# Patient Record
Sex: FEMALE | Race: WHITE | Employment: UNEMPLOYED | URBAN - METROPOLITAN AREA
[De-identification: names, ages, dates, MRNs, and addresses within clinical notes are randomized per-mention and may not be internally consistent; named-entity substitution may affect disease eponyms.]

---

## 2021-12-26 ENCOUNTER — HOSPITAL ENCOUNTER (INPATIENT)
Age: 31
LOS: 2 days | Discharge: HOME | End: 2021-12-28
Attending: OBSTETRICS & GYNECOLOGY | Admitting: OBSTETRICS & GYNECOLOGY
Payer: OTHER GOVERNMENT

## 2021-12-26 LAB
ABO + RH BLD: NORMAL
BASOPHILS # BLD: 0 K/UL (ref 0–0.1)
BASOPHILS NFR BLD: 0 % (ref 0–2)
BLOOD GROUP ANTIBODIES SERPL: NORMAL
DIFFERENTIAL METHOD BLD: ABNORMAL
EOSINOPHIL # BLD: 0.1 K/UL (ref 0–0.4)
EOSINOPHIL NFR BLD: 1 % (ref 0–5)
ERYTHROCYTE [DISTWIDTH] IN BLOOD BY AUTOMATED COUNT: 15.1 % (ref 11.6–14.5)
HCT VFR BLD AUTO: 29.4 % (ref 35–45)
HGB BLD-MCNC: 8.9 G/DL (ref 12–16)
IMM GRANULOCYTES # BLD AUTO: 0.1 K/UL (ref 0–0.04)
IMM GRANULOCYTES NFR BLD AUTO: 1 % (ref 0–0.5)
LYMPHOCYTES # BLD: 1.7 K/UL (ref 0.9–3.6)
LYMPHOCYTES NFR BLD: 19 % (ref 21–52)
MCH RBC QN AUTO: 24.9 PG (ref 24–34)
MCHC RBC AUTO-ENTMCNC: 30.3 G/DL (ref 31–37)
MCV RBC AUTO: 82.4 FL (ref 78–100)
MONOCYTES # BLD: 1 K/UL (ref 0.05–1.2)
MONOCYTES NFR BLD: 11 % (ref 3–10)
NEUTS SEG # BLD: 6.3 K/UL (ref 1.8–8)
NEUTS SEG NFR BLD: 69 % (ref 40–73)
NRBC # BLD: 0.02 K/UL (ref 0–0.01)
NRBC BLD-RTO: 0.2 PER 100 WBC
PLATELET # BLD AUTO: 250 K/UL (ref 135–420)
PMV BLD AUTO: 12.1 FL (ref 9.2–11.8)
RBC # BLD AUTO: 3.57 M/UL (ref 4.2–5.3)
SPECIMEN EXP DATE BLD: NORMAL
WBC # BLD AUTO: 9.2 K/UL (ref 4.6–13.2)

## 2021-12-26 PROCEDURE — 86901 BLOOD TYPING SEROLOGIC RH(D): CPT

## 2021-12-26 PROCEDURE — 65270000029 HC RM PRIVATE

## 2021-12-26 PROCEDURE — 74011250636 HC RX REV CODE- 250/636: Performed by: ADVANCED PRACTICE MIDWIFE

## 2021-12-26 PROCEDURE — 85025 COMPLETE CBC W/AUTO DIFF WBC: CPT

## 2021-12-26 PROCEDURE — 74011250636 HC RX REV CODE- 250/636: Performed by: MIDWIFE

## 2021-12-26 PROCEDURE — 75410000002 HC LABOR FEE PER 1 HR

## 2021-12-26 RX ORDER — OXYTOCIN/RINGER'S LACTATE 30/500 ML
10 PLASTIC BAG, INJECTION (ML) INTRAVENOUS AS NEEDED
Status: COMPLETED | OUTPATIENT
Start: 2021-12-26 | End: 2021-12-27

## 2021-12-26 RX ORDER — OXYTOCIN/0.9 % SODIUM CHLORIDE 30/500 ML
0-20 PLASTIC BAG, INJECTION (ML) INTRAVENOUS
Status: DISCONTINUED | OUTPATIENT
Start: 2021-12-26 | End: 2021-12-28 | Stop reason: HOSPADM

## 2021-12-26 RX ORDER — SODIUM CHLORIDE, SODIUM LACTATE, POTASSIUM CHLORIDE, CALCIUM CHLORIDE 600; 310; 30; 20 MG/100ML; MG/100ML; MG/100ML; MG/100ML
125 INJECTION, SOLUTION INTRAVENOUS CONTINUOUS
Status: DISCONTINUED | OUTPATIENT
Start: 2021-12-26 | End: 2021-12-27 | Stop reason: HOSPADM

## 2021-12-26 RX ORDER — OXYTOCIN/0.9 % SODIUM CHLORIDE 30/500 ML
87.3 PLASTIC BAG, INJECTION (ML) INTRAVENOUS AS NEEDED
Status: DISCONTINUED | OUTPATIENT
Start: 2021-12-26 | End: 2021-12-27 | Stop reason: HOSPADM

## 2021-12-26 RX ORDER — LIDOCAINE HYDROCHLORIDE 10 MG/ML
20 INJECTION, SOLUTION EPIDURAL; INFILTRATION; INTRACAUDAL; PERINEURAL AS NEEDED
Status: DISCONTINUED | OUTPATIENT
Start: 2021-12-26 | End: 2021-12-27 | Stop reason: HOSPADM

## 2021-12-26 RX ORDER — TERBUTALINE SULFATE 1 MG/ML
0.25 INJECTION SUBCUTANEOUS
Status: DISCONTINUED | OUTPATIENT
Start: 2021-12-26 | End: 2021-12-27 | Stop reason: HOSPADM

## 2021-12-26 RX ORDER — NALBUPHINE HYDROCHLORIDE 10 MG/ML
10 INJECTION, SOLUTION INTRAMUSCULAR; INTRAVENOUS; SUBCUTANEOUS
Status: DISCONTINUED | OUTPATIENT
Start: 2021-12-26 | End: 2021-12-27 | Stop reason: HOSPADM

## 2021-12-26 RX ORDER — METHYLERGONOVINE MALEATE 0.2 MG/ML
0.2 INJECTION INTRAVENOUS AS NEEDED
Status: DISCONTINUED | OUTPATIENT
Start: 2021-12-26 | End: 2021-12-27 | Stop reason: HOSPADM

## 2021-12-26 RX ORDER — BUTORPHANOL TARTRATE 2 MG/ML
2 INJECTION INTRAMUSCULAR; INTRAVENOUS
Status: DISCONTINUED | OUTPATIENT
Start: 2021-12-26 | End: 2021-12-27 | Stop reason: HOSPADM

## 2021-12-26 RX ORDER — HYDROMORPHONE HYDROCHLORIDE 1 MG/ML
1 INJECTION, SOLUTION INTRAMUSCULAR; INTRAVENOUS; SUBCUTANEOUS
Status: DISCONTINUED | OUTPATIENT
Start: 2021-12-26 | End: 2021-12-27 | Stop reason: HOSPADM

## 2021-12-26 RX ORDER — LORATADINE 10 MG/1
10 TABLET ORAL
COMMUNITY

## 2021-12-26 RX ORDER — MINERAL OIL
30 OIL (ML) ORAL AS NEEDED
Status: DISCONTINUED | OUTPATIENT
Start: 2021-12-26 | End: 2021-12-27 | Stop reason: HOSPADM

## 2021-12-26 RX ADMIN — Medication 6 MILLI-UNITS/MIN: at 22:01

## 2021-12-26 RX ADMIN — Medication 4 MILLI-UNITS/MIN: at 20:34

## 2021-12-26 RX ADMIN — SODIUM CHLORIDE, POTASSIUM CHLORIDE, SODIUM LACTATE AND CALCIUM CHLORIDE 125 ML/HR: 600; 310; 30; 20 INJECTION, SOLUTION INTRAVENOUS at 18:11

## 2021-12-26 RX ADMIN — Medication 2 MILLI-UNITS/MIN: at 19:44

## 2021-12-26 NOTE — H&P
History & Physical    Name: Ronald Adame MRN: [id number]  SSN: [id number]    YOB: 1990  Age: 27 y.o. Sex: female        Subjective:     Estimated Date of Delivery: 21  OB History        2    Para   1    Term   1            AB        Living           SAB        IAB        Ectopic        Molar        Multiple        Live Births                      Ms. Manolo Joyner is admitted with pregnancy at 41w0d for induction of labor. Prenatal course was complicated by anemia, EIF noted on u/s (quad screen negative), hx clavicle fracture in G1. [place] Please see prenatal records for details. No Known Allergies    Objective:     Vitals:  Vitals:    21 1728 21 1731   BP: 134/86    Pulse: (!) 119    Resp: 20    Temp: 97.7 °F (36.5 °C)    Weight:  105.2 kg (232 lb)   Height:  5' 7\" (1.702 m)        Physical Exam:  Patient without distress. Heart: Regular rate and rhythm, S1S2 present or without murmur or extra heart sounds  Lung: clear to auscultation throughout lung fields, no wheezes, no rales, no rhonchi and normal respiratory effort  Abdomen: soft, nontender  Fundus: soft and non tender  Perineum: blood absent, amniotic fluid absent  Cervical Exam: 4 cm dilated    50% effaced    -2 station    Presenting Part: cephalic  Cervical Position: posterior  Consistency: Soft  Membranes:  Intact  Fetal Heart Rate & Contraction pattern: Baseline: 165 per minute  Variability: moderate  Accelerations: yes  Decelerations: none  Uterine contractions: irregular, mild to palpation    Prenatal Labs:   No results found for: RUBELLAEXT, GRBSEXT, HBSAGEXT, HIVEXT, RPREXT, GONNOEXT, CHLAMEXT      Assessment/Plan:     Plan: Admit for IOL. Group B Strep was negative. -PIV, LR bolus now for fetal tachysystole  -Continuous EFM  -Anesthesia consult at pt request, pt has ncb plan. -Pitocin titration per protocol once CAT I FHT established  -Anticipate vaginal delivery. C/D for maternal/fetal indications. Dr. Tiffany Carrera notified of patient arrival and POC. Agrees at this time.     Signed By:  Santo Infante CNM     December 26, 2021

## 2021-12-27 PROBLEM — Z33.1 IUP (INTRAUTERINE PREGNANCY), INCIDENTAL: Status: ACTIVE | Noted: 2021-12-27

## 2021-12-27 PROBLEM — O48.0 POST TERM PREGNANCY AT 41 WEEKS GESTATION: Status: ACTIVE | Noted: 2021-12-27

## 2021-12-27 PROBLEM — O48.0 41 WEEKS GESTATION OF PREGNANCY: Status: ACTIVE | Noted: 2021-12-27

## 2021-12-27 PROBLEM — Z3A.41 POST TERM PREGNANCY AT 41 WEEKS GESTATION: Status: ACTIVE | Noted: 2021-12-27

## 2021-12-27 PROBLEM — Z3A.41 41 WEEKS GESTATION OF PREGNANCY: Status: ACTIVE | Noted: 2021-12-27

## 2021-12-27 PROCEDURE — 74011250637 HC RX REV CODE- 250/637

## 2021-12-27 PROCEDURE — 3E033VJ INTRODUCTION OF OTHER HORMONE INTO PERIPHERAL VEIN, PERCUTANEOUS APPROACH: ICD-10-PCS | Performed by: MIDWIFE

## 2021-12-27 PROCEDURE — 10907ZC DRAINAGE OF AMNIOTIC FLUID, THERAPEUTIC FROM PRODUCTS OF CONCEPTION, VIA NATURAL OR ARTIFICIAL OPENING: ICD-10-PCS | Performed by: MIDWIFE

## 2021-12-27 PROCEDURE — 74011250636 HC RX REV CODE- 250/636: Performed by: MIDWIFE

## 2021-12-27 PROCEDURE — 0HQ9XZZ REPAIR PERINEUM SKIN, EXTERNAL APPROACH: ICD-10-PCS | Performed by: MIDWIFE

## 2021-12-27 PROCEDURE — 74011250637 HC RX REV CODE- 250/637: Performed by: MIDWIFE

## 2021-12-27 PROCEDURE — 75410000002 HC LABOR FEE PER 1 HR

## 2021-12-27 PROCEDURE — 75410000000 HC DELIVERY VAGINAL/SINGLE

## 2021-12-27 PROCEDURE — 75410000003 HC RECOV DEL/VAG/CSECN EA 0.5 HR

## 2021-12-27 PROCEDURE — 74011000250 HC RX REV CODE- 250

## 2021-12-27 PROCEDURE — 74011250636 HC RX REV CODE- 250/636: Performed by: ADVANCED PRACTICE MIDWIFE

## 2021-12-27 PROCEDURE — 65270000029 HC RM PRIVATE

## 2021-12-27 RX ORDER — AMOXICILLIN 250 MG
1 CAPSULE ORAL
Status: DISCONTINUED | OUTPATIENT
Start: 2021-12-27 | End: 2021-12-28 | Stop reason: HOSPADM

## 2021-12-27 RX ORDER — FENTANYL CITRATE 50 UG/ML
100 INJECTION, SOLUTION INTRAMUSCULAR; INTRAVENOUS ONCE
Status: COMPLETED | OUTPATIENT
Start: 2021-12-27 | End: 2021-12-27

## 2021-12-27 RX ORDER — IBUPROFEN 400 MG/1
800 TABLET ORAL
Status: DISCONTINUED | OUTPATIENT
Start: 2021-12-27 | End: 2021-12-28 | Stop reason: HOSPADM

## 2021-12-27 RX ORDER — OXYCODONE AND ACETAMINOPHEN 5; 325 MG/1; MG/1
2 TABLET ORAL
Status: DISCONTINUED | OUTPATIENT
Start: 2021-12-27 | End: 2021-12-28 | Stop reason: HOSPADM

## 2021-12-27 RX ORDER — ONDANSETRON 2 MG/ML
4 INJECTION INTRAMUSCULAR; INTRAVENOUS
Status: DISCONTINUED | OUTPATIENT
Start: 2021-12-27 | End: 2021-12-28 | Stop reason: HOSPADM

## 2021-12-27 RX ORDER — LANOLIN ALCOHOL/MO/W.PET/CERES
3 CREAM (GRAM) TOPICAL
Status: DISCONTINUED | OUTPATIENT
Start: 2021-12-27 | End: 2021-12-28 | Stop reason: HOSPADM

## 2021-12-27 RX ORDER — MISOPROSTOL 200 UG/1
TABLET ORAL
Status: COMPLETED
Start: 2021-12-27 | End: 2021-12-27

## 2021-12-27 RX ORDER — PROMETHAZINE HYDROCHLORIDE 25 MG/ML
25 INJECTION, SOLUTION INTRAMUSCULAR; INTRAVENOUS
Status: DISCONTINUED | OUTPATIENT
Start: 2021-12-27 | End: 2021-12-28 | Stop reason: HOSPADM

## 2021-12-27 RX ORDER — ONDANSETRON 2 MG/ML
INJECTION INTRAMUSCULAR; INTRAVENOUS
Status: DISCONTINUED
Start: 2021-12-27 | End: 2021-12-27 | Stop reason: WASHOUT

## 2021-12-27 RX ORDER — FENTANYL CITRATE 50 UG/ML
INJECTION, SOLUTION INTRAMUSCULAR; INTRAVENOUS
Status: DISPENSED
Start: 2021-12-27 | End: 2021-12-27

## 2021-12-27 RX ORDER — ACETAMINOPHEN 325 MG/1
650 TABLET ORAL
Status: DISCONTINUED | OUTPATIENT
Start: 2021-12-27 | End: 2021-12-28 | Stop reason: HOSPADM

## 2021-12-27 RX ORDER — TRANEXAMIC ACID 10 MG/ML
INJECTION, SOLUTION INTRAVENOUS
Status: COMPLETED
Start: 2021-12-27 | End: 2021-12-27

## 2021-12-27 RX ORDER — CALCIUM CARBONATE 200(500)MG
400 TABLET,CHEWABLE ORAL AS NEEDED
Status: DISCONTINUED | OUTPATIENT
Start: 2021-12-27 | End: 2021-12-28 | Stop reason: HOSPADM

## 2021-12-27 RX ADMIN — TRANEXAMIC ACID 1000 MG: 10 INJECTION, SOLUTION INTRAVENOUS at 06:52

## 2021-12-27 RX ADMIN — IBUPROFEN 800 MG: 400 TABLET, FILM COATED ORAL at 18:40

## 2021-12-27 RX ADMIN — Medication 10000 MILLI-UNITS: at 06:00

## 2021-12-27 RX ADMIN — ANTACID TABLETS 400 MG: 500 TABLET, CHEWABLE ORAL at 05:03

## 2021-12-27 RX ADMIN — MISOPROSTOL 1000 MCG: 200 TABLET ORAL at 06:52

## 2021-12-27 RX ADMIN — FENTANYL CITRATE 100 MCG: 50 INJECTION, SOLUTION INTRAMUSCULAR; INTRAVENOUS at 06:05

## 2021-12-27 RX ADMIN — SODIUM CHLORIDE, POTASSIUM CHLORIDE, SODIUM LACTATE AND CALCIUM CHLORIDE 125 ML/HR: 600; 310; 30; 20 INJECTION, SOLUTION INTRAVENOUS at 04:06

## 2021-12-27 NOTE — PROGRESS NOTES
1130 TRANSFER - IN REPORT:    Verbal report received from LINDSAY Cee rn(name) on Jennifer Miranda Moder  being received from L&D(unit) for routine progression of care      Report consisted of patients Situation, Background, Assessment and   Recommendations(SBAR). Information from the following report(s) SBAR, Kardex, Intake/Output, MAR and Recent Results was reviewed with the receiving nurse. Opportunity for questions and clarification was provided. Assessment completed upon patients arrival to unit and care assumed. Oriented to room, call bell in reach, notified to  for assist to br , excessive bleeding or clot passage , OOB  tp perinieum, family @ bedside, pericare instructions given  1800 BEDSIDE_VERBAL_RECORDED_WRITTEN: shift change report given to Randa Maldonado (oncoming nurse) by David Estrada (offgoing nurse). Report given with SBAR, Kardex and MAR.

## 2021-12-27 NOTE — PROGRESS NOTES
0730: Bedside and Verbal shift change report given to RAY Palomares and TOYA Brambila RN (oncoming nurse) by Lizabeth Samayoa RN (offgoing nurse). Report included the following information SBAR, Procedure Summary, Intake/Output, MAR and Recent Results. 0930: Pt up to restroom, kamla care, pads and linen changed. 3765: Pt sitting up in chair, breastfeeding.

## 2021-12-27 NOTE — PROGRESS NOTES
1100: TRANSFER - IN REPORT:    Verbal report received from RAY Lopez RN (name) on Delories Mas  being received from Labor and delivery (unit) for routine progression of care      Report consisted of patients Situation, Background, Assessment and   Recommendations(SBAR). Information from the following report(s) SBAR, Procedure Summary, Intake/Output, MAR and Recent Results was reviewed with the receiving nurse. Opportunity for questions and clarification was provided. 1115: Bedside and verbal shift change report given by Leonle Alcantar RN to Shubham Gay RN.  Relinquished care of pt at this time

## 2021-12-27 NOTE — PROGRESS NOTES
191 Bedside and Verbal shift change report given to KENZIE Powers (oncoming nurse) by Carl Dupree RN (offgoing nurse). Report included the following information SBAR, Kardex, Intake/Output, MAR and Recent Results. Pt resting quietly. Birth plan reviewed and opportunity for questions provided. Patient states she plans to labor as long as possible without an epidural and may or may not get one toward the end of labor. Patient and  oriented to room and unit. Denies further needs at this time. [address] S. Ayah Quiros on unit. Reviewed EFM tracing and POC. Will start pitocin and hold at 6 mu/min for cervical ripening through the night.    0355 S. Corrina DAMONM to bedside per pt request for SVE.    0332 Pt c/o increased pressure. Zhane DAMONM to bedside. [address]. Corrina DAMONM to bedside. 0518 Pt states pushing involuntarily with contractions. Zhane Dutta CNM called to bedside. 0522 Patient actively pushing during contractions. This RN and provider remain at bedside providing continuous labor support, continuously assessing patient, vital signs, fetal heart rate, contraction pattern, progress of labor and descent, adjusting EFM and TOCO and palpating abdomen and contractions as needed. 1133  viable male infant. See delivery record. 0630 Maria Eugenia care and teaching provided. Ice maria eugenia pad, pad, bed pad changed. Patient instructed to call for assistance prior to getting up out of bed. Call bell within reach. Patient provided with ice water and juice. 3250 Increased bleeding with clots noted. Zhane Dutta CNM on unit doing another delivery. SBAR report given and verbal orders received for TXA and Cytotec.    0715 Bedside and Verbal shift change report given to RAY Crow RNC and TOYA Rhodes RN (oncoming nurse) by Clive Cortes. Sue Powers (offgoing nurse). Report included the following information SBAR, Kardex, Procedure Summary, Intake/Output, MAR and Recent Results.

## 2021-12-27 NOTE — PROGRESS NOTES
1800 Bedside shift report given to TYRESE Pierce, RN (Oncoming Nurse) from Flor Ward RN (outgoing nurse). Report consisted of patients Situation, Background, Assessment and Recommendations(SBAR). Information from the following report(s) SBAR, Kardex, Intake/Output, MAR and Recent Results. 1838 Pt rated pain at 5/10. Educated Pt on pain management, signs and symptoms to report. Pain medication administered. 2036 Pt sleeping with respiratory rate greater than 10. Room light dimmed. 2100 Pt refused Famotidine. 2319 Pt joined at bedside by baby and significant other. AAOx4. VSS. Pain 0/10. Educated Pt on pain management, signs and symptoms to report. Fundus Firm at  U and midline. Scant, rubra lochia. No clots noted. Bed in lowest position. Call bell within reach. 0038 Pt sitting in bed. Pt breastfeeding baby at this time. No needs at this time. 0321 Pt sleeping with respiratory rate greater than 10. Room light dimmed. 0444 Pt sitting in chair. Pt breastfeeding baby at this time. No needs at this time. 7743 Pt sleeping with respiratory rate greater than 10.    0715 Bedside shift report given to KENNY Rice, [address] Olivia Lopez (Oncoming Nurse) from Cassidy Peng RN (outgoing nurse). Report consisted of patients Situation, Background, Assessment and Recommendations(SBAR). Information from the following report(s) SBAR, Kardex, Intake/Output, MAR and Recent Results.

## 2021-12-27 NOTE — PROGRESS NOTES
1100- TRANSFER - OUT REPORT:    Verbal report given to YOSELYN GABRIEL (name) on Radha Perez  being transferred to Mother Baby (unit) for routine progression of care       Report consisted of patients Situation, Background, Assessment and   Recommendations(SBAR). Information from the following report(s) SBAR, Kardex and MAR was reviewed with the receiving nurse. Lines:   Peripheral IV 12/26/21 Anterior; Left Forearm (Active)   Site Assessment Clean, dry, & intact 12/27/21 0730   Phlebitis Assessment 0 12/27/21 0730   Infiltration Assessment 0 12/27/21 0730   Dressing Status Clean, dry, & intact 12/27/21 0730   Dressing Type Transparent 12/27/21 0730   Hub Color/Line Status Pink; Infusing 12/27/21 0730        Opportunity for questions and clarification was provided.       Patient transported with:   Registered Nurse

## 2021-12-27 NOTE — L&D DELIVERY NOTE
Delivery Summary    Patient: Brenda Rhoades MRN: [id number]  SSN: [id number]    YOB: 1990  Age: 27 y.o. Sex: female       Information for the patient's :  Sam gNo [[id number]]       Labor Events:    Labor: No    Steroids:     Cervical Ripening Date/Time:       Cervical Ripening Type: None   Antibiotics During Labor:     Rupture Identifier:      Rupture Date/Time: 2021 4:46 AM   Rupture Type: AROM;Bulging   Amniotic Fluid Volume: Moderate    Amniotic Fluid Description: Meconium    Amniotic Fluid Odor: None    Induction: AROM; Oxytocin       Induction Date/Time:        Indications for Induction: Post-term Gestation    Augmentation:     Augmentation Date/Time:      Indications for Augmentation:     Labor complications: Additional complications:        Delivery Events:  Indications For Episiotomy:     Episiotomy: None   Perineal Laceration(s): 1st   Repaired:     Periurethral Laceration Location:      Repaired:     Labial Laceration Location:     Repaired:     Sulcal Laceration Location:     Repaired:     Vaginal Laceration Location:     Repaired:     Cervical Laceration Location:     Repaired:     Repair Suture: None   Number of Repair Packets:     Estimated Blood Loss (ml): 250ml   Quantitative Blood Loss (ml)                Delivery Date: 2021    Delivery Time: 5:55 AM  Delivery Type:    Sex:  Male    Gestational Age: 41w1d   Delivery Clinician:  Obie Zhao  Living Status:     Delivery Location:              APGARS  One minute Five minutes Ten minutes   Skin color:            Heart rate:            Grimace:            Muscle tone:            Breathing: Totals:                Presentation: Vertex    Position: Middle Occiput Anterior  Resuscitation Method:        Meconium Stained:        Cord Information: 3 Vessels  Complications: Nuchal Cord Without Compressions  Cord around: head  Delayed cord clamping?  Yes  Cord clamped date/time:   Disposition of Cord Blood: Lab    Blood Gases Sent?: No    Placenta:  Date/Time: 2021  6:04 AM  Removal: Spontaneous      Appearance: Normal     [place] Measurements:  Birth Weight:        Birth Length:        Head Circumference:        Chest Circumference:       Abdominal Girth: Other Providers:   ;NANDO SMITH;;;;;;;DUC ENRIQUE;CAROLINE NO, Obstetrician;Primary Nurse;Primary  Nurse;Nicu Nurse;Neonatologist;Anesthesiologist;Crna;Nurse Practitioner;Midwife;Nursery Nurse           Group B Strep: No results found for: GRBSEXT, GRBSEXT  Information for the patient's :  Sam Ngo [[id number]]   No results found for: ABORH, PCTABR, PCTDIG, BILI, ABORHEXT, ABORH     No results for input(s): PCO2CB, PO2CB, HCO3I, SO2I, IBD, PTEMPI, SPECTI, PHICB, ISITE, IDEV, IALLEN in the last 72 hours. Presented to room as patient was involuntarily pushing with contractions, with natural child birth. Patient initially pushed in hands and knees x30 minutes then transitioned to LL for pushing. Fetal head delivered OA restituted to ROT, nuchal cord noted, unable to reduce, somersaulted through. Delivery assisted by FOB. Infant placed on maternal abdomen, initially pale and stunned. Delayed cord clamping x30 seconds then cord clamped and cut by FOB, infant taken to the warmer for eval by awaiting NNP. Pitocin started for active third stage management. Placenta delivered spontaneously, otero, 3VC, apparently intact on exam. Fundus firm at 2 below umbilicus, bleeding small. 100 mcg fentanyl ordered for pain relief. Perineum inspected, first degree laceration, hemostatic without repair. Counts correct x3 mom and baby left bonding skin to skin in stable condition.      Gale Seals MS, [address] [facility]

## 2021-12-28 VITALS
SYSTOLIC BLOOD PRESSURE: 113 MMHG | TEMPERATURE: 97.8 F | DIASTOLIC BLOOD PRESSURE: 78 MMHG | HEART RATE: 85 BPM | HEIGHT: 67 IN | WEIGHT: 232 LBS | RESPIRATION RATE: 18 BRPM | BODY MASS INDEX: 36.41 KG/M2 | OXYGEN SATURATION: 98 %

## 2021-12-28 PROBLEM — D64.9 ANEMIA: Status: ACTIVE | Noted: 2021-12-28

## 2021-12-28 LAB
HCT VFR BLD AUTO: 30.3 % (ref 35–45)
HGB BLD-MCNC: 9.2 G/DL (ref 12–16)

## 2021-12-28 PROCEDURE — 36415 COLL VENOUS BLD VENIPUNCTURE: CPT

## 2021-12-28 PROCEDURE — 85018 HEMOGLOBIN: CPT

## 2021-12-28 PROCEDURE — 74011250637 HC RX REV CODE- 250/637

## 2021-12-28 RX ORDER — LANOLIN ALCOHOL/MO/W.PET/CERES
325 CREAM (GRAM) TOPICAL 2 TIMES DAILY WITH MEALS
Qty: 60 TABLET | Refills: 3 | Status: SHIPPED | OUTPATIENT
Start: 2021-12-28

## 2021-12-28 RX ORDER — IBUPROFEN 800 MG/1
800 TABLET ORAL
Qty: 90 TABLET | Refills: 0 | Status: SHIPPED | OUTPATIENT
Start: 2021-12-28

## 2021-12-28 RX ORDER — LANOLIN ALCOHOL/MO/W.PET/CERES
1 CREAM (GRAM) TOPICAL 2 TIMES DAILY WITH MEALS
Status: DISCONTINUED | OUTPATIENT
Start: 2021-12-28 | End: 2021-12-28 | Stop reason: HOSPADM

## 2021-12-28 RX ADMIN — FERROUS SULFATE TAB 325 MG (65 MG ELEMENTAL FE) 325 MG: 325 (65 FE) TAB at 17:10

## 2021-12-28 RX ADMIN — FERROUS SULFATE TAB 325 MG (65 MG ELEMENTAL FE) 325 MG: 325 (65 FE) TAB at 10:31

## 2021-12-28 NOTE — LACTATION NOTE
This note was copied from a baby's chart. Mom educated on breastfeeding basics--hunger cues, feeding on demand, waking baby if baby sleeps too long between feeds, importance of skin to skin, positioning and latching, risk of pacifier use and supplemental feedings, and importance of rooming in--and use of log sheet. Mom also educated on benefits of breastfeeding for herself and baby. Mom verbalized understanding. No questions at this time. Per mom, infant latched well with a NS on the right breast, but was able to get  latched well without the NS at the last feeding. DOL behaviors were discussed.

## 2021-12-28 NOTE — PROGRESS NOTES
0715- Bedside shift change report given to [facility] and 1011 Guanako Dawson (oncoming nurse) by Forest De Dios (offgoing nurse). Report included the following information SBAR, Intake/Output, MAR and Recent Results. 9639- shift assessment complete, see flowsheets. IV removed. No needs at this time    0915- Pt signed consent for infant Circumcision at this time     1031- pt up in room with infant in arms. Pt medicated per MAR    1230- pt up in room. No needs at this time    1610- I have reviewed discharge instructions with the patient. The patient verbalized understanding. Infant H/P and AVS provided and gone over. All questions answered     1650- reassessment complete, see flowsheets    1725- bands verified. Infant footprints done. Pt provided with new AVS. All questions answered.  Patient armband removed and shredded    1730- pt discharged home with infant in stable condition

## 2021-12-28 NOTE — DISCHARGE SUMMARY
Obstetrical Discharge Summary     Name: Georgette Younger MRN: [id number]  SSN: [id number]    YOB: 1990  Age: 27 y.o. Sex: female      Admit Date: 2021    Discharge Date: 2021     Admitting Physician: Varun Pittman MD     Attending Physician:  Max Inman MD     Admission Diagnoses: IUP (intrauterine pregnancy), incidental [Z33.1]  41 weeks gestation of pregnancy [Z3A.41]  Post term pregnancy at 41 weeks gestation [O48.0, Z3A.41]    Discharge Diagnoses:   Information for the patient's :  Da Quiroz [[id number]]   Delivery of a 4.35 kg male infant via Vaginal, Spontaneous on 2021 at 5:55 AM  by Coy Pallas. Apgars were 8  and 9 . Additional Diagnoses:   Hospital Problems  Never Reviewed          Codes Class Noted POA    * (Principal)  (spontaneous vaginal delivery) ICD-10-CM: O80  ICD-9-CM: 650  2021 Unknown        Anemia ICD-10-CM: D64.9  ICD-9-CM: 285.9  2021 Yes        IUP (intrauterine pregnancy), incidental ICD-10-CM: Z33.1  ICD-9-CM: V22.2  2021 Unknown        41 weeks gestation of pregnancy ICD-10-CM: Z3A.41  ICD-9-CM: 645.10  2021 Unknown        Post term pregnancy at 41 weeks gestation ICD-10-CM: O48.0, Z3A.41  ICD-9-CM: 645.10  2021 Unknown           No results found for: RUBELLAEXT, GRBSEXT    Immunization(s): There is no immunization history for the selected administration types on file for this patient. Hospital Course: Normal hospital course following the delivery. Patient Instructions:   Current Discharge Medication List      START taking these medications    Details   ibuprofen (MOTRIN) 800 mg tablet    Ferrous Sulfate (Iron) 325 mg table Take 1 Tablet by mouth every eight (8) hours as needed (Pain scale 4-6). Qty: 90 Tablet, Refills: 0  Take 1 Tablet by mouth with meals 2 times a day.   Quantity 60 RF:  3         CONTINUE these medications which have NOT CHANGED    Details   loratadine (Claritin) 10 mg tablet Take 10 mg by mouth. PNV Comb #2-Iron-FA-Omega 3 29-1-400 mg cmpk Take  by mouth. Reference my discharge instructions. Follow-up Appointments   Procedures    FOLLOW UP VISIT Appointment in: 6 Weeks Please call OB office to schedule 6 wk postpartum follow up appointment. Please call OB office to schedule 6 wk postpartum follow up appointment.      Standing Status:   Standing     Number of Occurrences:   1     Order Specific Question:   Appointment in     Answer:   6 Weeks        Signed By:  Jp Maxwell CNM     December 28, 2021

## 2021-12-28 NOTE — PROGRESS NOTES
Problem: Pain  Goal: *Control of Pain  Outcome: Progressing Towards Goal     Problem: Patient Education: Go to Patient Education Activity  Goal: Patient/Family Education  Outcome: Progressing Towards Goal     Problem: Vaginal Delivery: Postpartum Day 1  Goal: Activity/Safety  Outcome: Progressing Towards Goal  Goal: Consults, if ordered  Outcome: Progressing Towards Goal  Goal: Diagnostic Test/Procedures  Outcome: Progressing Towards Goal  Goal: Nutrition/Diet  Outcome: Progressing Towards Goal  Goal: Discharge Planning  Outcome: Progressing Towards Goal  Goal: Medications  Outcome: Progressing Towards Goal  Goal: Treatments/Interventions/Procedures  Outcome: Progressing Towards Goal  Goal: Psychosocial  Outcome: Progressing Towards Goal  Goal: *Vital signs within defined limits  Outcome: Progressing Towards Goal  Goal: *Labs within defined limits  Outcome: Progressing Towards Goal  Goal: *Hemodynamically stable  Outcome: Progressing Towards Goal  Goal: *Optimal pain control at patient's stated goal  Outcome: Progressing Towards Goal  Goal: *Participates in infant care  Outcome: Progressing Towards Goal  Goal: *Demonstrates progressive activity  Outcome: Progressing Towards Goal  Goal: *Performs self perineal care  Outcome: Progressing Towards Goal  Goal: *Appropriate parent-infant bonding  Outcome: Progressing Towards Goal  Goal: *Tolerating diet  Outcome: Progressing Towards Goal  Goal: *Performs self breast care  Outcome: Progressing Towards Goal

## 2021-12-28 NOTE — PROGRESS NOTES
Problem: Patient Education: Go to Patient Education Activity  Goal: Patient/Family Education  12/27/2021 1921 by Litzy Gama RN  Outcome: Progressing Towards Goal  12/27/2021 1921 by Litzy Gama RN  Outcome: Progressing Towards Goal     Problem: Pain  Goal: *Control of Pain  12/27/2021 1921 by Litzy Gama RN  Outcome: Progressing Towards Goal  12/27/2021 1921 by Litzy Gama RN  Outcome: Progressing Towards Goal     Problem: Vaginal Delivery: Day of Delivery-Post delivery  Goal: Activity/Safety  Outcome: Progressing Towards Goal  Goal: Consults, if ordered  Outcome: Progressing Towards Goal  Goal: Discharge Planning  Outcome: Progressing Towards Goal  Goal: Medications  Outcome: Progressing Towards Goal  Goal: Treatments/Interventions/Procedures  Outcome: Progressing Towards Goal  Goal: *Vital signs within defined limits  Outcome: Progressing Towards Goal  Goal: *Labs within defined limits  Outcome: Progressing Towards Goal  Goal: *Hemodynamically stable  Outcome: Progressing Towards Goal  Goal: *Optimal pain control at patient's stated goal  Outcome: Progressing Towards Goal  Goal: *Participates in infant care  Outcome: Progressing Towards Goal  Goal: *Demonstrates progressive activity  Outcome: Progressing Towards Goal

## 2021-12-28 NOTE — PROGRESS NOTES
Progress Note    Patient: Gurpreet Duran MRN: [id number]  SSN: [id number]    YOB: 1990  Age: 27 y.o. Sex: female      Subjective:     Postpartum Day: 1 Vaginal Delivery    The patient is without complaints. The patient is ambulating well. The patient is tolerating a normal diet. The baby is well. Having lots of abdominal cramping and BLE edema. Objective:      Patient Vitals for the past 8 hrs:   BP Temp Pulse Resp SpO2   12/28/21 0810 128/83 97.7 °F (36.5 °C) 83 18 98 %     LABS: Recent Results (from the past 24 hour(s))   HGB & HCT    Collection Time: 12/28/21  4:48 AM   Result Value Ref Range    HGB 9.2 (L) 12.0 - 16.0 g/dL    HCT 30.3 (L) 35.0 - 45.0 %       Lab Results   Component Value Date/Time    HGB 9.2 (L) 12/28/2021 04:48 AM     Lab Results   Component Value Date/Time    HCT 30.3 (L) 12/28/2021 04:48 AM      Lochia:  appropriate   Uterine Fundus:   firm, -1     Lab/Data Review: All lab results for the last 24 hours reviewed. Assessment:     Status post: Doing well postpartum vaginal delivery day 1. 2_ non pitting edema BLE. Lots of abdominal cramping. Plan:     Continue day 1 post-vaginal delivery orders. Postpartum care discussed including diet, ambulation, and actvitiy restrictions. Plan to DC home today. Advised to take ibuprofen every 8 hours around the clock for edema and cramping for next 48 hours.       Signed By: Carole Donnelly CNM     December 28, 2021

## 2021-12-28 NOTE — DISCHARGE INSTRUCTIONS
POST DELIVERY DISCHARGE INSTRUCTIONS    Name: Chandler Camarillo  YOB: 1990  Primary Diagnosis: Active Problems:    IUP (intrauterine pregnancy), incidental (12/27/2021)      41 weeks gestation of pregnancy (12/27/2021)      Post term pregnancy at 41 weeks gestation (12/27/2021)        General:     Diet/Diet Restrictions:  Eight 8-ounce glasses of fluid daily (water, juices); avoid excessive caffeine intake. Meals/snacks as desired which are high in fiber and carbohydrates and low in fat and cholesterol. Physical Activity / Restrictions / Safety:     Avoid heavy lifting, no more that 8 lbs. For 2-3 weeks; limit use of stairs to 2 times daily for the first week home. No driving for one week. Avoid intercourse 4-6 weeks, no douching or tampon use. Check with obstetrician before starting or resuming an exercise program.         Discharge Instructions/Special Treatment/Home Care Needs:     Continue prenatal vitamins. Continue to use squirt bottle with warm water on your episiotomy after each bathroom use until bleeding stops. Call your doctor for the following:     Fever over 101 degrees by mouth. Vaginal bleeding heavier than a normal menstrual period or clot larger than a golf ball. Red streaks or increased swelling of legs, painful red streaks on your breast.  Painful urination, constipation and increased pain or swelling   If you feel extremely anxious or overwhelmed. If you have thoughts of harming yourself and/or your baby. Pain Management:     Pain Management:   Take Acetaminophen (Tylenol) or Ibuprofen (Advil, Motrin), as directed for pain. Use a warm Sitz bath 3 times daily to relieve episiotomy or hemorrhoidal discomfort. . For hemorrhoidal discomfort, use Tucks and Anusol cream as needed and directed. Follow-Up Care:      These are general instructions for a healthy lifestyle:    No smoking/ No tobacco products/ Avoid exposure to second hand smoke    Surgeon Everton Fajardo Warning:  Quitting smoking now greatly reduces serious risk to your health. Obesity, smoking, and sedentary lifestyle greatly increases your risk for illness    A healthy diet, regular physical exercise & weight monitoring are important for maintaining a healthy lifestyle    Recognize signs and symptoms of STROKE:    F-face looks uneven    A-arms unable to move or move unevenly    S-speech slurred or non-existent    T-time-call 911 as soon as signs and symptoms begin-DO NOT go       Back to bed or wait to see if you get better-TIME IS BRAIN. Patient Education        Depression After Childbirth: Care Instructions  Overview     Many women get the \"baby blues\" during the first few days after childbirth. You may lose sleep, feel irritable, and cry easily. You may feel happy one minute and sad the next. Hormone changes are one cause of these emotional changes. Also, the demands of a new baby, along with visits from relatives or other family needs, can add to the stress. The \"baby blues\" often peak around the fourth day. Then they ease up in less than 2 weeks. If your moodiness or anxiety lasts for more than 2 weeks, or if you feel like life is not worth living, you may have postpartum depression. This is different for each person. Some mothers with serious depression may worry intensely about their infant's well-being. Others may feel distant from their child. Some mothers may even feel that they might harm their baby. Some may have signs of paranoia, wondering if someone is watching them. Depression is not a sign of weakness. It's a medical condition that requires treatment. Medicine and counseling often work well to reduce depression. Talk to your doctor about taking antidepressant medicine while breastfeeding. Follow-up care is a key part of your treatment and safety. Be sure to make and go to all appointments, and call your doctor if you are having problems.  It's also a good idea to know your test results and keep a list of the medicines you take. How do you know if you are depressed? With all the changes in your life, you may not know if you are depressed. Pregnancy sometimes causes changes in how you feel that are similar to the symptoms of depression. Symptoms of depression include:  · Feeling sad or hopeless and losing interest in daily activities. These are the most common symptoms of depression. · Sleeping too much or not enough. · Feeling tired. You may feel as if you have no energy. · Eating too much or too little. · Writing or talking about death, such as writing suicide notes or talking about guns, knives, or pills. Keep the numbers for these national suicide hotlines: 5-019-999-TALK (8-882.851.3667) and 3-417-CNNBNZD (0-489.933.8440). If you or someone you know talks about suicide or feeling hopeless, get help right away. How can you care for yourself at home? · Be safe with medicines. Take your medicines exactly as prescribed. Call your doctor if you think you are having a problem with your medicine. · Eat a healthy diet so that you can keep up your energy. · Get regular daily exercise, such as walks, to help improve your mood. · Get as much sunlight as possible. Keep your shades and curtains open. Get outside as much as you can. · Avoid using alcohol or other substances to feel better. · Get as much rest and sleep as possible. Avoid doing too much. Being too tired can increase depression. · Play stimulating music throughout your day and soothing music at night. · Schedule outings and visits with friends and family. Ask them to call you regularly, so that you don't feel alone. · Ask for help with preparing food and other daily tasks. Family and friends are often happy to help with a . · Be honest with yourself and those who care about you. Tell them about your struggle. · Join a support group of new mothers.  No one can better understand the challenges of caring for a  than other new mothers. · If you feel like life is not worth living or you're feeling hopeless, get help right away. Keep the numbers for these national suicide hotlines: 2-298-714-TALK (1-775.295.3508) and 3-125-SPJVWGV (9-967.815.3469). When should you call for help? Call 911 anytime you think you may need emergency care. For example, call if:    · You feel you cannot stop from hurting yourself, your baby, or someone else. Call your doctor now or seek immediate medical care if:    · You are having trouble caring for yourself or your baby.     · You hear voices. Watch closely for changes in your health, and be sure to contact your doctor if:    · You have problems with your depression medicine.     · You do not get better as expected. Where can you learn more? Go to http://www.gray.com/  Enter [id number] in the search box to learn more about \"Depression After Childbirth: Care Instructions. \"  Current as of: June 16, 2021               Content Version: 13.0  © 5659-5665 [employer]. Care instructions adapted under license by [employer] (which disclaims liability or warranty for this information). If you have questions about a medical condition or this instruction, always ask your healthcare professional. [address] any warranty or liability for your use of this information. Patient Education        Breastfeeding: Care Instructions  Overview     Breastfeeding has many benefits. It may lower your baby's chances of getting an infection. It also may make it less likely that your baby will have problems such as diabetes and obesity later in life. Breastfeeding also helps you bond with your baby. In the first days after birth, your breasts make a thick, yellow liquid called colostrum. This liquid gives your baby nutrients and antibodies against infection. It is all that babies need in the first days after birth.  Your breasts will fill with milk a few days after the birth. Breastfeeding is a skill that gets better with practice. Be patient with yourself and your baby. If you have trouble, you can get help and keep breastfeeding. Follow-up care is a key part of your treatment and safety. Be sure to make and go to all appointments, and call your doctor if you are having problems. It's also a good idea to know your test results and keep a list of the medicines you take. How can you care for yourself at home? · Breastfeed your baby whenever your baby is hungry. In the first 2 weeks, your baby will breastfeed at least 8 times in a 24-hour period. This will help you keep up your supply of milk. Signs that your baby is hungry include:  ? Sucking on their hands. ? [place] their lips. ? Turning their head toward your breast.  · Put a bed pillow or a nursing pillow on your lap to support your arms and your baby. · Hold your baby in a comfortable position. ? You can hold your baby in several ways. One of the most common positions is the cradle hold. One arm supports your baby, with your baby's head in the bend of your elbow. Your open hand supports your baby's bottom or back. Your baby's belly lies against yours. ? If you had your baby by , or , try the football hold. This position keeps your baby off your belly. Tuck your baby under your arm, with your baby's body along the side you will be feeding on. Support your baby's upper body with your arm. With that hand you can control your baby's head to bring their mouth to your breast.  ? Try different positions with each feeding. If you are having problems, ask for help from your doctor or a lactation consultant. · To get your baby to latch on:  ? Support and narrow your breast with one hand using a \"U hold,\" with your thumb on the outer side of your breast and your fingers on the inner side. You can also use a \"C hold,\" with all your fingers below the nipple and your thumb above it.  Try the different holds to get the deepest latch for whichever breastfeeding position you use. Your other arm is behind your baby's back, with your hand supporting the base of the baby's head. Position your fingers and thumb to point toward your baby's ears. ? You can touch your baby's lower lip with your nipple to get your baby's mouth to open. Wait until your baby opens up really wide, like a big yawn. Then be sure to bring the baby quickly to your breast--not your breast to the baby. As you bring your baby toward your breast, use your other hand to support the breast and guide it into your baby's mouth. ? Both the nipple and a large portion of the darker area around the nipple (areola) should be in the baby's mouth. The baby's lips should be flared outward, not folded in (inverted). ? Listen for a regular sucking and swallowing pattern while the baby is feeding. If you can't see or hear a swallowing pattern, watch the baby's ears. They will wiggle slightly when the baby swallows. If the baby's nose appears to be blocked by your breast, bring your baby's body closer to you. This will help tilt the baby's head back slightly, so just the edge of one nostril is clear for breathing. ? When your baby is latched, you can usually remove your hand from supporting your breast and use it to cradle under your baby. Now just relax and breastfeed your baby. · You will know that your baby is feeding well when:  ? Your baby's mouth covers a lot of the areola, and the lips are flared out. ? Your baby's chin and nose rest against your breast.  ? Sucking is deep and rhythmic, with short pauses. ? You are able to see and hear your baby swallowing. ? You do not feel pain in your nipple. · Offer both breasts to your baby at each feeding. Each time you breastfeed, switch which breast you start with. · Anytime you need to remove your baby from the breast, put one finger in the corner of your baby's mouth.  Push your finger between your baby's gums to gently break the seal. If you don't break the tight seal before you remove your baby, your nipples can become sore, cracked, or bruised. · After you finish feeding, gently pat your baby's back to let out any swallowed air. After your baby burps, offer the breast again, or offer the other breast. Sometimes a baby will want to keep feeding after being burped. When should you call for help? Call your doctor now or seek immediate medical care if:    · You have symptoms of a breast infection, such as:  ? Increased pain, swelling, redness, or warmth around a breast.  ? Red streaks extending from the breast.  ? Pus draining from a breast.  ? A fever.     · Your baby has no wet diapers for 6 hours. Watch closely for changes in your health, and be sure to contact your doctor if:    · Your baby has trouble latching on to your breast.     · You continue to have pain or discomfort when breastfeeding.     · You have other questions or concerns. Where can you learn more? Go to http://www.gray.com/  Enter P492 in the search box to learn more about \"Breastfeeding: Care Instructions. \"  Current as of: June 16, 2021               Content Version: 13.0  © 5957-8849 [employer]. Care instructions adapted under license by [employer] (which disclaims liability or warranty for this information). If you have questions about a medical condition or this instruction, always ask your healthcare professional. [place] [address] any warranty or liability for your use of this information.

## 2022-03-18 PROBLEM — Z33.1 IUP (INTRAUTERINE PREGNANCY), INCIDENTAL: Status: ACTIVE | Noted: 2021-12-27

## 2022-03-19 PROBLEM — Z3A.41 41 WEEKS GESTATION OF PREGNANCY: Status: ACTIVE | Noted: 2021-12-27

## 2022-03-19 PROBLEM — O48.0 41 WEEKS GESTATION OF PREGNANCY: Status: ACTIVE | Noted: 2021-12-27

## 2022-03-19 PROBLEM — Z3A.41 POST TERM PREGNANCY AT 41 WEEKS GESTATION: Status: ACTIVE | Noted: 2021-12-27

## 2022-03-19 PROBLEM — O48.0 POST TERM PREGNANCY AT 41 WEEKS GESTATION: Status: ACTIVE | Noted: 2021-12-27

## 2022-03-20 PROBLEM — D64.9 ANEMIA: Status: ACTIVE | Noted: 2021-12-28

## 2024-07-05 ENCOUNTER — HOSPITAL ENCOUNTER (INPATIENT)
Facility: HOSPITAL | Age: 34
LOS: 1 days | Discharge: HOME | End: 2024-07-06
Attending: OBSTETRICS & GYNECOLOGY | Admitting: OBSTETRICS & GYNECOLOGY
Payer: OTHER GOVERNMENT

## 2024-07-05 LAB
ABO + RH BLD: NORMAL
AMNIOTEST, POC: ABNORMAL
BLOOD GROUP ANTIBODIES SERPL: NORMAL
ERYTHROCYTE [DISTWIDTH] IN BLOOD BY AUTOMATED COUNT: 15 % (ref 11.6–14.5)
HCT VFR BLD AUTO: 34.8 % (ref 35–45)
HGB BLD-MCNC: 11.1 G/DL (ref 12–16)
Lab: ABNORMAL
MCH RBC QN AUTO: 26.8 PG (ref 24–34)
MCHC RBC AUTO-ENTMCNC: 31.9 G/DL (ref 31–37)
MCV RBC AUTO: 84.1 FL (ref 78–100)
NEGATIVE QC PASS/FAIL: ABNORMAL
NRBC # BLD: 0 K/UL (ref 0–0.01)
NRBC BLD-RTO: 0 PER 100 WBC
PLATELET # BLD AUTO: 235 K/UL (ref 135–420)
PMV BLD AUTO: 10.3 FL (ref 9.2–11.8)
POSITIVE QC PASS/FAIL: ABNORMAL
RBC # BLD AUTO: 4.14 M/UL (ref 4.2–5.3)
SPECIMEN EXP DATE BLD: NORMAL
WBC # BLD AUTO: 10.9 K/UL (ref 4.6–13.2)

## 2024-07-05 PROCEDURE — 85027 COMPLETE CBC AUTOMATED: CPT

## 2024-07-05 PROCEDURE — 1100000000 HC RM PRIVATE

## 2024-07-05 PROCEDURE — 6360000002 HC RX W HCPCS: Performed by: ADVANCED PRACTICE MIDWIFE

## 2024-07-05 PROCEDURE — 2580000003 HC RX 258: Performed by: ADVANCED PRACTICE MIDWIFE

## 2024-07-05 PROCEDURE — 6370000000 HC RX 637 (ALT 250 FOR IP): Performed by: ADVANCED PRACTICE MIDWIFE

## 2024-07-05 PROCEDURE — 86850 RBC ANTIBODY SCREEN: CPT

## 2024-07-05 PROCEDURE — 59025 FETAL NON-STRESS TEST: CPT

## 2024-07-05 PROCEDURE — 86901 BLOOD TYPING SEROLOGIC RH(D): CPT

## 2024-07-05 PROCEDURE — 4A1HXCZ MONITORING OF PRODUCTS OF CONCEPTION, CARDIAC RATE, EXTERNAL APPROACH: ICD-10-PCS | Performed by: ADVANCED PRACTICE MIDWIFE

## 2024-07-05 PROCEDURE — 6360000002 HC RX W HCPCS

## 2024-07-05 PROCEDURE — 36415 COLL VENOUS BLD VENIPUNCTURE: CPT

## 2024-07-05 PROCEDURE — 7220000101 HC DELIVERY VAGINAL/SINGLE

## 2024-07-05 PROCEDURE — 7210000100 HC LABOR FEE PER 1 HR

## 2024-07-05 PROCEDURE — 86900 BLOOD TYPING SEROLOGIC ABO: CPT

## 2024-07-05 RX ORDER — MISOPROSTOL 200 UG/1
800 TABLET ORAL PRN
Status: DISCONTINUED | OUTPATIENT
Start: 2024-07-05 | End: 2024-07-06 | Stop reason: HOSPADM

## 2024-07-05 RX ORDER — MISOPROSTOL 200 UG/1
400 TABLET ORAL PRN
Status: DISCONTINUED | OUTPATIENT
Start: 2024-07-05 | End: 2024-07-05 | Stop reason: ALTCHOICE

## 2024-07-05 RX ORDER — SODIUM CHLORIDE 0.9 % (FLUSH) 0.9 %
5-40 SYRINGE (ML) INJECTION PRN
Status: DISCONTINUED | OUTPATIENT
Start: 2024-07-05 | End: 2024-07-06 | Stop reason: HOSPADM

## 2024-07-05 RX ORDER — SODIUM CHLORIDE 9 MG/ML
INJECTION, SOLUTION INTRAVENOUS PRN
Status: DISCONTINUED | OUTPATIENT
Start: 2024-07-05 | End: 2024-07-05 | Stop reason: ALTCHOICE

## 2024-07-05 RX ORDER — ONDANSETRON 2 MG/ML
4 INJECTION INTRAMUSCULAR; INTRAVENOUS EVERY 6 HOURS PRN
Status: DISCONTINUED | OUTPATIENT
Start: 2024-07-05 | End: 2024-07-06 | Stop reason: HOSPADM

## 2024-07-05 RX ORDER — SENNA AND DOCUSATE SODIUM 50; 8.6 MG/1; MG/1
1 TABLET, FILM COATED ORAL DAILY PRN
Status: DISCONTINUED | OUTPATIENT
Start: 2024-07-05 | End: 2024-07-06 | Stop reason: HOSPADM

## 2024-07-05 RX ORDER — METHYLERGONOVINE MALEATE 0.2 MG/ML
200 INJECTION INTRAVENOUS PRN
Status: DISCONTINUED | OUTPATIENT
Start: 2024-07-05 | End: 2024-07-05 | Stop reason: ALTCHOICE

## 2024-07-05 RX ORDER — ONDANSETRON 4 MG/1
4 TABLET, ORALLY DISINTEGRATING ORAL EVERY 6 HOURS PRN
Status: DISCONTINUED | OUTPATIENT
Start: 2024-07-05 | End: 2024-07-06 | Stop reason: HOSPADM

## 2024-07-05 RX ORDER — LANOLIN/MINERAL OIL
LOTION (ML) TOPICAL PRN
Status: DISCONTINUED | OUTPATIENT
Start: 2024-07-05 | End: 2024-07-06 | Stop reason: HOSPADM

## 2024-07-05 RX ORDER — TRANEXAMIC ACID 10 MG/ML
1000 INJECTION, SOLUTION INTRAVENOUS
Status: DISCONTINUED | OUTPATIENT
Start: 2024-07-05 | End: 2024-07-05 | Stop reason: ALTCHOICE

## 2024-07-05 RX ORDER — LIDOCAINE HYDROCHLORIDE 10 MG/ML
1 INJECTION, SOLUTION EPIDURAL; INFILTRATION; INTRACAUDAL; PERINEURAL ONCE
Status: DISCONTINUED | OUTPATIENT
Start: 2024-07-05 | End: 2024-07-05 | Stop reason: ALTCHOICE

## 2024-07-05 RX ORDER — CARBOPROST TROMETHAMINE 250 UG/ML
250 INJECTION, SOLUTION INTRAMUSCULAR PRN
Status: DISCONTINUED | OUTPATIENT
Start: 2024-07-05 | End: 2024-07-06 | Stop reason: HOSPADM

## 2024-07-05 RX ORDER — SODIUM CHLORIDE, SODIUM LACTATE, POTASSIUM CHLORIDE, CALCIUM CHLORIDE 600; 310; 30; 20 MG/100ML; MG/100ML; MG/100ML; MG/100ML
INJECTION, SOLUTION INTRAVENOUS CONTINUOUS
Status: DISCONTINUED | OUTPATIENT
Start: 2024-07-05 | End: 2024-07-06 | Stop reason: HOSPADM

## 2024-07-05 RX ORDER — SODIUM CHLORIDE 9 MG/ML
INJECTION, SOLUTION INTRAVENOUS PRN
Status: DISCONTINUED | OUTPATIENT
Start: 2024-07-05 | End: 2024-07-06 | Stop reason: HOSPADM

## 2024-07-05 RX ORDER — SIMETHICONE 80 MG
80 TABLET,CHEWABLE ORAL EVERY 6 HOURS PRN
Status: DISCONTINUED | OUTPATIENT
Start: 2024-07-05 | End: 2024-07-06 | Stop reason: HOSPADM

## 2024-07-05 RX ORDER — OXYTOCIN 10 [USP'U]/ML
INJECTION, SOLUTION INTRAMUSCULAR; INTRAVENOUS
Status: COMPLETED
Start: 2024-07-05 | End: 2024-07-05

## 2024-07-05 RX ORDER — ACETAMINOPHEN 325 MG/1
650 TABLET ORAL EVERY 4 HOURS PRN
Status: DISCONTINUED | OUTPATIENT
Start: 2024-07-05 | End: 2024-07-05 | Stop reason: ALTCHOICE

## 2024-07-05 RX ORDER — DOCUSATE SODIUM 100 MG/1
100 CAPSULE, LIQUID FILLED ORAL 2 TIMES DAILY
Status: DISCONTINUED | OUTPATIENT
Start: 2024-07-05 | End: 2024-07-05 | Stop reason: ALTCHOICE

## 2024-07-05 RX ORDER — MISOPROSTOL 200 UG/1
600 TABLET ORAL PRN
Status: DISCONTINUED | OUTPATIENT
Start: 2024-07-05 | End: 2024-07-06 | Stop reason: HOSPADM

## 2024-07-05 RX ORDER — SODIUM CHLORIDE, SODIUM LACTATE, POTASSIUM CHLORIDE, CALCIUM CHLORIDE 600; 310; 30; 20 MG/100ML; MG/100ML; MG/100ML; MG/100ML
INJECTION, SOLUTION INTRAVENOUS CONTINUOUS
Status: DISCONTINUED | OUTPATIENT
Start: 2024-07-05 | End: 2024-07-05 | Stop reason: ALTCHOICE

## 2024-07-05 RX ORDER — DOCUSATE SODIUM 100 MG/1
100 CAPSULE, LIQUID FILLED ORAL 2 TIMES DAILY
Status: DISCONTINUED | OUTPATIENT
Start: 2024-07-05 | End: 2024-07-06 | Stop reason: HOSPADM

## 2024-07-05 RX ORDER — SODIUM CHLORIDE 0.9 % (FLUSH) 0.9 %
5-40 SYRINGE (ML) INJECTION EVERY 12 HOURS SCHEDULED
Status: DISCONTINUED | OUTPATIENT
Start: 2024-07-05 | End: 2024-07-06

## 2024-07-05 RX ORDER — SODIUM CHLORIDE 0.9 % (FLUSH) 0.9 %
5-40 SYRINGE (ML) INJECTION PRN
Status: DISCONTINUED | OUTPATIENT
Start: 2024-07-05 | End: 2024-07-05 | Stop reason: ALTCHOICE

## 2024-07-05 RX ORDER — METHYLERGONOVINE MALEATE 0.2 MG/ML
200 INJECTION INTRAVENOUS PRN
Status: DISCONTINUED | OUTPATIENT
Start: 2024-07-05 | End: 2024-07-06 | Stop reason: HOSPADM

## 2024-07-05 RX ORDER — MISOPROSTOL 200 UG/1
TABLET ORAL
Status: DISCONTINUED
Start: 2024-07-05 | End: 2024-07-05 | Stop reason: WASHOUT

## 2024-07-05 RX ORDER — SODIUM CHLORIDE, SODIUM LACTATE, POTASSIUM CHLORIDE, AND CALCIUM CHLORIDE .6; .31; .03; .02 G/100ML; G/100ML; G/100ML; G/100ML
1000 INJECTION, SOLUTION INTRAVENOUS PRN
Status: DISCONTINUED | OUTPATIENT
Start: 2024-07-05 | End: 2024-07-05 | Stop reason: ALTCHOICE

## 2024-07-05 RX ORDER — ACETAMINOPHEN 500 MG
1000 TABLET ORAL EVERY 8 HOURS SCHEDULED
Status: DISCONTINUED | OUTPATIENT
Start: 2024-07-05 | End: 2024-07-05

## 2024-07-05 RX ORDER — FERROUS SULFATE 325(65) MG
325 TABLET ORAL
Status: DISCONTINUED | OUTPATIENT
Start: 2024-07-05 | End: 2024-07-06 | Stop reason: HOSPADM

## 2024-07-05 RX ORDER — MAGNESIUM CARB/ALUMINUM HYDROX 105-160MG
TABLET,CHEWABLE ORAL
Status: DISCONTINUED
Start: 2024-07-05 | End: 2024-07-05 | Stop reason: ALTCHOICE

## 2024-07-05 RX ORDER — TERBUTALINE SULFATE 1 MG/ML
0.25 INJECTION, SOLUTION SUBCUTANEOUS
Status: DISCONTINUED | OUTPATIENT
Start: 2024-07-05 | End: 2024-07-05 | Stop reason: ALTCHOICE

## 2024-07-05 RX ORDER — CARBOPROST TROMETHAMINE 250 UG/ML
250 INJECTION, SOLUTION INTRAMUSCULAR PRN
Status: DISCONTINUED | OUTPATIENT
Start: 2024-07-05 | End: 2024-07-05 | Stop reason: ALTCHOICE

## 2024-07-05 RX ORDER — ONDANSETRON 4 MG/1
4 TABLET, ORALLY DISINTEGRATING ORAL EVERY 6 HOURS PRN
Status: DISCONTINUED | OUTPATIENT
Start: 2024-07-05 | End: 2024-07-05

## 2024-07-05 RX ORDER — ACETAMINOPHEN 500 MG
1000 TABLET ORAL EVERY 8 HOURS SCHEDULED
Status: DISCONTINUED | OUTPATIENT
Start: 2024-07-05 | End: 2024-07-06 | Stop reason: HOSPADM

## 2024-07-05 RX ORDER — MAGNESIUM CARB/ALUMINUM HYDROX 105-160MG
30 TABLET,CHEWABLE ORAL PRN
Status: DISCONTINUED | OUTPATIENT
Start: 2024-07-05 | End: 2024-07-05 | Stop reason: ALTCHOICE

## 2024-07-05 RX ORDER — SODIUM CHLORIDE 0.9 % (FLUSH) 0.9 %
5-40 SYRINGE (ML) INJECTION EVERY 12 HOURS SCHEDULED
Status: DISCONTINUED | OUTPATIENT
Start: 2024-07-05 | End: 2024-07-05 | Stop reason: ALTCHOICE

## 2024-07-05 RX ORDER — SODIUM CHLORIDE, SODIUM LACTATE, POTASSIUM CHLORIDE, AND CALCIUM CHLORIDE .6; .31; .03; .02 G/100ML; G/100ML; G/100ML; G/100ML
500 INJECTION, SOLUTION INTRAVENOUS PRN
Status: DISCONTINUED | OUTPATIENT
Start: 2024-07-05 | End: 2024-07-05 | Stop reason: ALTCHOICE

## 2024-07-05 RX ORDER — ONDANSETRON 2 MG/ML
4 INJECTION INTRAMUSCULAR; INTRAVENOUS EVERY 6 HOURS PRN
Status: DISCONTINUED | OUTPATIENT
Start: 2024-07-05 | End: 2024-07-05

## 2024-07-05 RX ORDER — BISACODYL 10 MG
10 SUPPOSITORY, RECTAL RECTAL DAILY PRN
Status: DISCONTINUED | OUTPATIENT
Start: 2024-07-05 | End: 2024-07-06 | Stop reason: HOSPADM

## 2024-07-05 RX ORDER — IBUPROFEN 400 MG/1
800 TABLET ORAL EVERY 8 HOURS SCHEDULED
Status: DISCONTINUED | OUTPATIENT
Start: 2024-07-05 | End: 2024-07-06 | Stop reason: HOSPADM

## 2024-07-05 RX ADMIN — DOCUSATE SODIUM 100 MG: 100 CAPSULE, LIQUID FILLED ORAL at 22:38

## 2024-07-05 RX ADMIN — SODIUM CHLORIDE 5 MILLION UNITS: 900 INJECTION INTRAVENOUS at 04:32

## 2024-07-05 RX ADMIN — IBUPROFEN 800 MG: 400 TABLET, FILM COATED ORAL at 22:39

## 2024-07-05 RX ADMIN — IBUPROFEN 800 MG: 400 TABLET, FILM COATED ORAL at 06:58

## 2024-07-05 RX ADMIN — IBUPROFEN 800 MG: 400 TABLET, FILM COATED ORAL at 14:54

## 2024-07-05 RX ADMIN — OXYTOCIN 10 UNITS: 10 INJECTION INTRAVENOUS at 05:01

## 2024-07-05 ASSESSMENT — PAIN DESCRIPTION - ORIENTATION: ORIENTATION: POSTERIOR;LOWER

## 2024-07-05 ASSESSMENT — PAIN - FUNCTIONAL ASSESSMENT: PAIN_FUNCTIONAL_ASSESSMENT: ACTIVITIES ARE NOT PREVENTED

## 2024-07-05 ASSESSMENT — PAIN DESCRIPTION - LOCATION: LOCATION: BACK

## 2024-07-05 ASSESSMENT — PAIN DESCRIPTION - DESCRIPTORS: DESCRIPTORS: ACHING

## 2024-07-05 ASSESSMENT — PAIN SCALES - GENERAL
PAINLEVEL_OUTOF10: 1
PAINLEVEL_OUTOF10: 1

## 2024-07-05 NOTE — LACTATION NOTE
This note was copied from a baby's chart.     24 2500   Visit Information   Lactation Consult Visit Type IP Initial Consult   Visit Length 15 minutes   Referral Received From Referred by MD   Reason for Visit Education;Normal  Visit   Breast Feeding History/Assessment   Breastfeeding History Yes   Longest duration (#) 24   Longest Duration months   Complications No   Feeding Assessment: Maternal Factors   Position and Latch Independently   Care Plan/Breast Care   Breast Care Lanolin provided     Mom educated on breastfeeding basics--hunger cues, feeding on demand, waking baby if baby sleeps too long between feeds, importance of skin to skin, positioning and latching, risk of pacifier use and supplemental feedings, and importance of rooming in--and use of log sheet. Mom also educated on benefits of breastfeeding for herself and baby. Mom verbalized understanding. No questions at this time.  Per mom, infant latching and nursing well. Encouraged to call for assistance with feeding. Mom verbalized understanding.

## 2024-07-05 NOTE — L&D DELIVERY NOTE
Mark Jha [[id number]]      Labor Events     Labor: No   Steroids: None  Cervical Ripening Date/Time:      Antibiotics Received during Labor: Yes  Rupture Identifier: Sac 1  Rupture Date/Time:      Rupture Type: SROM  Fluid Color: Clear  Fluid Odor: None  Fluid Volume: None  Induction: None  Augmentation: None  Labor Complications: None              Anesthesia    Method: None       Delivery Details      Delivery Date: 24 Delivery Time: 04:51:00   Delivery Type: Vaginal, Spontaneous               Presentation    Presentation: Vertex  Position: Middle  _: Occiput  _: Anterior       Shoulder Dystocia    Shoulder Dystocia Present?: No       Assisted Delivery Details    Forceps Attempted?: No  Vacuum Extractor Attempted?: No                           Cord    Vessels: 3 Vessels  Complications: None  Delayed Cord Clamping?: Yes  Cord Blood Disposition: Lab  Gases Sent?: No              Placenta    Removal: Spontaneous  Appearance: Intact  Disposition: Discarded       Lacerations    Episiotomy: None  Perineal Lacerations: None  Other Lacerations: no non-perineal laceration       Vaginal Counts    Initial Count Personnel: KRISTINE DONNELLY CNM  Initial Count Verified By: MIKI ALMENDAREZ RN  Intial Sponge Count: Correct Intial Needles Count: Correct Intial Instruments Count: Correct   Final Sponges Count: Correct Final Needles  Count: Correct Final Instruments Count: Correct   Final Count Personnel: KRISTINE DONNELLY CNM  Final Count Verified By: MIKI ALMENDAREZ RN  Accurate Final Count?: Yes       Blood Loss  Mother: Nanda Jha #[id number]     Start of Mother's Information      Delivery Blood Loss  24 1651 - 24 0511      None                 End of Mother's Information  Mother: Nanda Jha #[id number]                Delivery Providers    Delivering clinician: Crystal Donnelly APRN - CNM     Provider Role     Obstetrician    Ignacia Almendarez RN Primary Nurse    Gisselle Sherman RN Primary

## 2024-07-05 NOTE — H&P
Department of Obstetrics and Gynecology  [facility] Obstetrics History and Physical        CHIEF COMPLAINT:  contractions, rupture of membranes    HISTORY OF PRESENT ILLNESS:      The patient is a 34 y.o.  at 39.4 weeks that presents with complaints of contractions and leaking fluid. Reports +FM. Denies any vaginal bleeding. Prenatal history complicated by GBS positive, anemia, vitamin D deficiency, AMA, hx of PPH with previous delivery and hx of infant fractured clavicle with first pregnancy-no shoulder dystocia reported. Please see prenatal records for more details.         OB History    Para Term  AB Living   3   2     1   SAB IAB Ectopic Molar Multiple Live Births                    # Outcome Date GA Lbr Arturo/2nd Weight Sex Delivery Anes PTL Lv   1 Current                Medications Prior to Admission:  Medications Prior to Admission: ferrous sulfate (IRON 325) 325 (65 Fe) MG tablet, Take 325 mg by mouth 2 times daily (with meals)  ibuprofen (ADVIL;MOTRIN) 800 MG tablet, Take 800 mg by mouth every 8 hours as needed  loratadine (CLARITIN) 10 MG tablet, Take 10 mg by mouth  Allergies:  Patient has no allergy information on record.    REVIEW OF SYSTEMS:    Patient has no history of depression.  Patient has no symptoms of depression  Review of systems wnl with exception of HPI    PHYSICAL EXAM:    General appearance:  awake, alert, cooperative, no apparent distress, and appears stated age  Neurologic:  Awake, alert, oriented to name, place and time.   Lungs:  Normal effort  Abdomen:  Soft, non-tender  Fetal heart rate:  Baseline Heart Rate 135, accelerations:  present  long term variability:  moderate  decelerations:  absent    Cervix: Per RN    DILATION:  5 cm  EFFACEMENT:   80%  STATION:  -2 cm        Contraction frequency:  2-4 minutes  Membranes:  Ruptured clear fluid        ASSESSMENT AND PLAN:      Principal Problem:    IUP (intrauterine pregnancy), incidental    Admit to L&D  Place IV/Routine

## 2024-07-05 NOTE — PLAN OF CARE
Problem: Postpartum  Goal: Experiences normal postpartum course  Description:  Postpartum OB-Pregnancy care plan goal which identifies if the mother is experiencing a normal postpartum course  2024 by Tonya Walton RN  Outcome: Progressing  2024 by Tonya Walton RN  Flowsheets (Taken 2024)  Experiences Normal Postpartum Course:   Monitor maternal vital signs   Assess uterine involution  Goal: Appropriate maternal -  bonding  Description:  Postpartum OB-Pregnancy care plan goal which identifies if the mother and  are bonding appropriately  Outcome: Progressing  Goal: Establishment of infant feeding pattern  Description:  Postpartum OB-Pregnancy care plan goal which identifies if the mother is establishing a feeding pattern with their   Outcome: Progressing  Flowsheets (Taken 2024)  Establishment of Infant Feeding Pattern: Refer to lactation as needed  Goal: Incisions, wounds, or drain sites healing without S/S of infection  Outcome: Progressing  Flowsheets (Taken 2024)  Incisions, Wounds, or Drain Sites Healing Without Sign and Symptoms of Infection: ADMISSION and DAILY: Assess and document risk factors for pressure ulcer development     Problem: Pain  Goal: Verbalizes/displays adequate comfort level or baseline comfort level  Outcome: Progressing  Flowsheets (Taken 2024)  Verbalizes/displays adequate comfort level or baseline comfort level:   Encourage patient to monitor pain and request assistance   Assess pain using appropriate pain scale   Administer analgesics based on type and severity of pain and evaluate response   Implement non-pharmacological measures as appropriate and evaluate response   Notify Licensed Independent Practitioner if interventions unsuccessful or patient reports new pain   Consider cultural and social influences on pain and pain management     Problem: Infection - Adult  Goal: Absence of infection at  Chronic Conditions and Co-Morbidity Symptoms are Monitored and Maintained or Improved:   Monitor and assess patient's chronic conditions and comorbid symptoms for stability, deterioration, or improvement   Collaborate with multidisciplinary team to address chronic and comorbid conditions and prevent exacerbation or deterioration   Update acute care plan with appropriate goals if chronic or comorbid symptoms are exacerbated and prevent overall improvement and discharge

## 2024-07-06 VITALS
SYSTOLIC BLOOD PRESSURE: 127 MMHG | WEIGHT: 235 LBS | HEIGHT: 67 IN | DIASTOLIC BLOOD PRESSURE: 76 MMHG | RESPIRATION RATE: 18 BRPM | HEART RATE: 73 BPM | OXYGEN SATURATION: 97 % | BODY MASS INDEX: 36.88 KG/M2 | TEMPERATURE: 97.5 F

## 2024-07-06 LAB
HCT VFR BLD AUTO: 28.6 % (ref 35–45)
HGB BLD-MCNC: 9 G/DL (ref 12–16)

## 2024-07-06 PROCEDURE — 85014 HEMATOCRIT: CPT

## 2024-07-06 PROCEDURE — 85018 HEMOGLOBIN: CPT

## 2024-07-06 PROCEDURE — 6370000000 HC RX 637 (ALT 250 FOR IP): Performed by: ADVANCED PRACTICE MIDWIFE

## 2024-07-06 PROCEDURE — 36415 COLL VENOUS BLD VENIPUNCTURE: CPT

## 2024-07-06 RX ORDER — IBUPROFEN 800 MG/1
800 TABLET ORAL EVERY 8 HOURS SCHEDULED
Qty: 120 TABLET | Refills: 3 | Status: SHIPPED | OUTPATIENT
Start: 2024-07-06

## 2024-07-06 RX ADMIN — DOCUSATE SODIUM 100 MG: 100 CAPSULE, LIQUID FILLED ORAL at 08:12

## 2024-07-06 RX ADMIN — FERROUS SULFATE TAB 325 MG (65 MG ELEMENTAL FE) 325 MG: 325 (65 FE) TAB at 08:12

## 2024-07-06 RX ADMIN — IBUPROFEN 800 MG: 400 TABLET, FILM COATED ORAL at 08:13

## 2024-07-06 ASSESSMENT — PAIN DESCRIPTION - ORIENTATION: ORIENTATION: MID

## 2024-07-06 ASSESSMENT — PAIN - FUNCTIONAL ASSESSMENT: PAIN_FUNCTIONAL_ASSESSMENT: ACTIVITIES ARE NOT PREVENTED

## 2024-07-06 ASSESSMENT — PAIN DESCRIPTION - DESCRIPTORS: DESCRIPTORS: CRAMPING

## 2024-07-06 ASSESSMENT — PAIN SCALES - GENERAL: PAINLEVEL_OUTOF10: 1

## 2024-07-06 ASSESSMENT — PAIN DESCRIPTION - LOCATION: LOCATION: ABDOMEN

## 2024-07-06 NOTE — PLAN OF CARE
Problem: Postpartum  Goal: Experiences normal postpartum course  Description:  Postpartum OB-Pregnancy care plan goal which identifies if the mother is experiencing a normal postpartum course  2024 184 by Kristin Larsen RN  Outcome: Adequate for Discharge  2024 0940 by Kristin Larsen RN  Outcome: Progressing  Flowsheets  Taken 2024 0805 by Kristin Larsen RN  Experiences Normal Postpartum Course: Monitor maternal vital signs  Taken 2024 by Mariama Diaz RN  Experiences Normal Postpartum Course:   Monitor maternal vital signs   Assess uterine involution  Goal: Appropriate maternal -  bonding  Description:  Postpartum OB-Pregnancy care plan goal which identifies if the mother and  are bonding appropriately  2024 by Kristin Larsen RN  Outcome: Adequate for Discharge  2024 0940 by Kristin Larsen RN  Outcome: Progressing  Goal: Establishment of infant feeding pattern  Description:  Postpartum OB-Pregnancy care plan goal which identifies if the mother is establishing a feeding pattern with their   2024 by Kristin Larsen RN  Outcome: Adequate for Discharge  2024 0940 by Kristin Larsen RN  Outcome: Progressing  Flowsheets  Taken 2024 0805 by Kristin Larsen RN  Establishment of Infant Feeding Pattern: Assess breast/bottle feeding  Taken 2024 by Mariama Diaz RN  Establishment of Infant Feeding Pattern:   Assess breast/bottle feeding   Refer to lactation as needed  Goal: Incisions, wounds, or drain sites healing without S/S of infection  2024 by Kristin Larsen RN  Outcome: Adequate for Discharge  2024 0940 by Kristin Larsen RN  Outcome: Progressing  Flowsheets  Taken 2024 0805 by Kristin Larsen RN  Incisions, Wounds, or Drain Sites Healing Without Sign and Symptoms of Infection: TWICE DAILY: Assess and document skin integrity  Taken 2024 by Mariama Diaz RN  Incisions, Wounds, or Drain  MEI Foster  Outcome: Adequate for Discharge  7/6/2024 0940 by Kristin Larsen RN  Outcome: Progressing  Flowsheets  Taken 7/6/2024 0805 by Kristin Larsen RN  Absence of infection during hospitalization: Assess and monitor for signs and symptoms of infection  Taken 7/5/2024 2239 by Mariama Diaz RN  Absence of infection during hospitalization:   Assess and monitor for signs and symptoms of infection   Monitor lab/diagnostic results   Monitor all insertion sites i.e., indwelling lines, tubes and drains   Administer medications as ordered   Instruct and encourage patient and family to use good hand hygiene technique  Goal: Absence of fever/infection during anticipated neutropenic period  7/6/2024 1842 by Kristin Larsen RN  Outcome: Adequate for Discharge  7/6/2024 0940 by Kristin Larsen RN  Outcome: Progressing  Flowsheets (Taken 7/6/2024 0805)  Absence of fever/infection during anticipated neutropenic period: Monitor white blood cell count     Problem: Safety - Adult  Goal: Free from fall injury  7/6/2024 1842 by Kristin Larsen RN  Outcome: Adequate for Discharge  7/6/2024 0940 by Kristin Larsen RN  Outcome: Progressing     Problem: Discharge Planning  Goal: Discharge to home or other facility with appropriate resources  7/6/2024 1842 by Kristin Larsen RN  Outcome: Adequate for Discharge  7/6/2024 0940 by Kristin Larsen RN  Outcome: Progressing  Flowsheets  Taken 7/6/2024 0805 by Kristin Larsen RN  Discharge to home or other facility with appropriate resources:   Identify barriers to discharge with patient and caregiver   Identify discharge learning needs (meds, wound care, etc)  Taken 7/5/2024 2239 by Mariama Diaz RN  Discharge to home or other facility with appropriate resources:   Identify barriers to discharge with patient and caregiver   Arrange for needed discharge resources and transportation as appropriate   Identify discharge learning needs (meds, wound care, etc)   Arrange for

## 2024-07-06 NOTE — DISCHARGE SUMMARY
Name: Nanda Jha MRN: [id number]  SSN: [id number]    YOB: 1990  Age: 34 y.o.  Sex: female      Allergies: Patient has no known allergies.    Admit Date: 2024    Discharge Date: 2024     Admitting Physician: Natasha Willams MD     Attending Physician:  Maurice Diaz MD     * Admission Diagnoses: IUP (intrauterine pregnancy), incidental [Z33.1]    * Discharge Diagnoses:   Information for the patient's :  Mark Jha [[id number]]   @[id number]@      Additional Diagnoses:    Lab Results   Component Value Date/Time    ABORH O POSITIVE 2024 04:15 AM      Immunization History   Administered Date(s) Administered    COVID-19, PFIZER PURPLE top, DILUTE for use, (age 12 y+), 30mcg/0.3mL 2022, 2022       * Procedures:   * No surgery found *           * Discharge Condition: Good    * Hospital Course: Normal hospital course following the delivery.    * Disposition: Home    Discharge Medications:      Medication List        CHANGE how you take these medications      ibuprofen 800 MG tablet  Commonly known as: ADVIL;MOTRIN  Take 1 tablet by mouth every 8 hours  What changed:   when to take this  reasons to take this            CONTINUE taking these medications      ferrous sulfate 325 (65 Fe) MG tablet  Commonly known as: IRON 325            STOP taking these medications      loratadine 10 MG tablet  Commonly known as: CLARITIN     Vitamin D3 125 MCG (5000 UT) Tabs               Where to Get Your Medications        These medications were sent to [facility] - [place], [place] - [address] [phone] - F [phone]  [address] [place] [facility] [address]      Phone: [phone]   ibuprofen 800 MG tablet         * Follow-up Care/Patient Instructions:  Activity: activity as tolerated  Diet: regular diet  Wound Care: keep wound clean and dry

## 2024-07-06 NOTE — DISCHARGE INSTRUCTIONS
Your [place] at Home: Care Instructions  During your baby's first few weeks, you may feel overwhelmed at times.  care gets easier with every day. Soon you will know what each cry means, and you'll be able to figure out what your baby needs and wants.    To keep the umbilical cord uncovered, fold the diaper below the cord. Or you can use special diapers for newborns that have a cutout for the cord.   To keep the cord dry, give your baby a sponge bath instead of bathing them in a tub. The cord should fall off in a week or two.         Feeding your baby   Feed your baby whenever they're hungry. Feedings may be short at first but will get longer.  Wake your baby to feed, if you need to.  Breastfeed at least 8 times every 24 hours, or formula-feed at least 6 times every 24 hours.        Understanding your baby's sleeping   Newborns sleep most of the day and wake up about every 2 to 3 hours to eat.  While sleeping, your baby may sometimes make sounds or seem restless.  At first, your baby may sleep through loud noises.        Keeping your baby safe while they sleep   Always put your baby to sleep on their back.  Don't put sleep positioners, bumper pads, loose bedding, or stuffed animals in the crib.  Don't sleep with your baby. This includes in your bed or on a couch or chair.  Have your baby sleep in the same room as you for at least the first 6 months.  Don't place your baby in a car seat, sling, swing, bouncer, or stroller to sleep.        Changing your baby's diapers   Check your baby's diaper (and change if needed) at least every 2 hours.  Expect about 3 wet diapers a day for the first few days. Then expect 6 or more wet diapers a day.  Keep track of your baby's wet diapers and bowel habits. Let your doctor know of any changes.        Keeping your baby healthy   Take your baby for any tests your doctor recommends. For example, babies may need follow-up tests for jaundice before their first doctor

## 2024-07-06 NOTE — PLAN OF CARE
Problem: Postpartum  Goal: Experiences normal postpartum course  Description:  Postpartum OB-Pregnancy care plan goal which identifies if the mother is experiencing a normal postpartum course  2024 by Mariama Diaz RN  Outcome: Progressing  Flowsheets (Taken 2024 1455 by Isabel Castillo RN)  Experiences Normal Postpartum Course:   Monitor maternal vital signs   Assess uterine involution  2024 by Tonya Walton RN  Outcome: Progressing  2024 08 by Tonya Walton RN  Flowsheets (Taken 2024 0707)  Experiences Normal Postpartum Course:   Monitor maternal vital signs   Assess uterine involution  Goal: Appropriate maternal -  bonding  Description:  Postpartum OB-Pregnancy care plan goal which identifies if the mother and  are bonding appropriately  2024 by Mariama Diaz RN  Outcome: Progressing  2024 by Tonya Walton RN  Outcome: Progressing  Goal: Establishment of infant feeding pattern  Description:  Postpartum OB-Pregnancy care plan goal which identifies if the mother is establishing a feeding pattern with their   2024 by Mariama Diaz RN  Outcome: Progressing  Flowsheets (Taken 2024 1455 by Isabel Castillo RN)  Establishment of Infant Feeding Pattern:   Assess breast/bottle feeding   Refer to lactation as needed  2024 by Tonya Walton RN  Outcome: Progressing  Flowsheets (Taken 2024 0707)  Establishment of Infant Feeding Pattern: Refer to lactation as needed  Goal: Incisions, wounds, or drain sites healing without S/S of infection  2024 by Mariama Diaz RN  Outcome: Progressing  2024 by Tonya Walton RN  Outcome: Progressing  Flowsheets (Taken 2024 0707)  Incisions, Wounds, or Drain Sites Healing Without Sign and Symptoms of Infection: ADMISSION and DAILY: Assess and document risk factors for pressure ulcer development     Problem: Pain  Goal: Verbalizes/displays adequate  comfort level or baseline comfort level  7/5/2024 2008 by Mariama Diaz RN  Outcome: Progressing  Flowsheets (Taken 7/5/2024 1455 by Isabel Castillo RN)  Verbalizes/displays adequate comfort level or baseline comfort level:   Encourage patient to monitor pain and request assistance   Assess pain using appropriate pain scale   Administer analgesics based on type and severity of pain and evaluate response   Implement non-pharmacological measures as appropriate and evaluate response  7/5/2024 0806 by Tonya Walton RN  Outcome: Progressing  Flowsheets (Taken 7/5/2024 0707)  Verbalizes/displays adequate comfort level or baseline comfort level:   Encourage patient to monitor pain and request assistance   Assess pain using appropriate pain scale   Administer analgesics based on type and severity of pain and evaluate response   Implement non-pharmacological measures as appropriate and evaluate response   Notify Licensed Independent Practitioner if interventions unsuccessful or patient reports new pain   Consider cultural and social influences on pain and pain management     Problem: Infection - Adult  Goal: Absence of infection at discharge  7/5/2024 2008 by Mariama Diaz RN  Outcome: Progressing  7/5/2024 0806 by Tonya Walton RN  Outcome: Progressing  Flowsheets (Taken 7/5/2024 0707)  Absence of infection at discharge:   Assess and monitor for signs and symptoms of infection   Monitor lab/diagnostic results   Monitor all insertion sites i.e., indwelling lines, tubes and drains   Administer medications as ordered   Instruct and encourage patient and family to use good hand hygiene technique  Goal: Absence of infection during hospitalization  7/5/2024 2008 by Mariama Diaz RN  Outcome: Progressing  7/5/2024 0806 by Tonya Walton RN  Outcome: Progressing  Flowsheets (Taken 7/5/2024 0707)  Absence of infection during hospitalization:   Assess and monitor for signs and symptoms of infection   Monitor

## 2024-07-06 NOTE — PLAN OF CARE
Problem: Postpartum  Goal: Experiences normal postpartum course  Description:  Postpartum OB-Pregnancy care plan goal which identifies if the mother is experiencing a normal postpartum course  2024 0940 by Kristin Larsen RN  Outcome: Progressing  Flowsheets  Taken 2024 08 by Kristin Larsen RN  Experiences Normal Postpartum Course: Monitor maternal vital signs  Taken 2024 by Mariama Diaz RN  Experiences Normal Postpartum Course:   Monitor maternal vital signs   Assess uterine involution  2024 by Mariama Diaz RN  Outcome: Progressing  Flowsheets (Taken 2024 1455 by Isabel Castillo RN)  Experiences Normal Postpartum Course:   Monitor maternal vital signs   Assess uterine involution  Goal: Appropriate maternal -  bonding  Description:  Postpartum OB-Pregnancy care plan goal which identifies if the mother and  are bonding appropriately  2024 by Kristin Larsen RN  Outcome: Progressing  2024 by Mariama Diaz RN  Outcome: Progressing  Goal: Establishment of infant feeding pattern  Description:  Postpartum OB-Pregnancy care plan goal which identifies if the mother is establishing a feeding pattern with their   2024 09 by Kristin Larsen RN  Outcome: Progressing  Flowsheets  Taken 2024 08 by Kristin Larsen RN  Establishment of Infant Feeding Pattern: Assess breast/bottle feeding  Taken 2024 by Mariama Diaz RN  Establishment of Infant Feeding Pattern:   Assess breast/bottle feeding   Refer to lactation as needed  2024 by Mariama Diaz RN  Outcome: Progressing  Flowsheets (Taken 2024 1455 by Isabel Castillo RN)  Establishment of Infant Feeding Pattern:   Assess breast/bottle feeding   Refer to lactation as needed  Goal: Incisions, wounds, or drain sites healing without S/S of infection  2024 09 by Kristin Larsen RN  Outcome: Progressing  Flowsheets  Taken 2024 08 by Suzie

## 2024-07-06 NOTE — LACTATION NOTE
This note was copied from a baby's chart.     24 1415   Visit Information   Lactation Consult Visit Type IP Consult Follow Up   Visit Length 30 minutes   Referral Received From Lactation Consultant Follow-up   Reason for Visit Education;Normal [place] Visit   Left Side Feeding   Infant Latch Observations Rooting;Good latch on   Infant Position Cross cradle   Infant Response to Feeding Feeding well   LATCH Documentation   Latch 2   Audible Swallowing 2   Type of Nipple 2   Comfort (Breast/Nipple) 2   Hold (Positioning) 2   LATCH Score 10     Lactation discharge education completed. Mom verbalized understanding.     Infant latched and nursing well.